# Patient Record
Sex: MALE | Race: WHITE | NOT HISPANIC OR LATINO | URBAN - METROPOLITAN AREA
[De-identification: names, ages, dates, MRNs, and addresses within clinical notes are randomized per-mention and may not be internally consistent; named-entity substitution may affect disease eponyms.]

---

## 2017-08-31 ENCOUNTER — RECORDS - HEALTHEAST (OUTPATIENT)
Dept: LAB | Facility: CLINIC | Age: 57
End: 2017-08-31

## 2017-08-31 LAB
CHOLEST SERPL-MCNC: 190 MG/DL
FASTING STATUS PATIENT QL REPORTED: ABNORMAL
HDLC SERPL-MCNC: 30 MG/DL
LDLC SERPL CALC-MCNC: 124 MG/DL
PSA SERPL-MCNC: 1 NG/ML (ref 0–3.5)
TRIGL SERPL-MCNC: 181 MG/DL

## 2018-03-14 ENCOUNTER — RECORDS - HEALTHEAST (OUTPATIENT)
Dept: LAB | Facility: CLINIC | Age: 58
End: 2018-03-14

## 2018-03-14 LAB
ANION GAP SERPL CALCULATED.3IONS-SCNC: 10 MMOL/L (ref 5–18)
BUN SERPL-MCNC: 12 MG/DL (ref 8–22)
CALCIUM SERPL-MCNC: 9.5 MG/DL (ref 8.5–10.5)
CHLORIDE BLD-SCNC: 103 MMOL/L (ref 98–107)
CHOLEST SERPL-MCNC: 203 MG/DL
CO2 SERPL-SCNC: 27 MMOL/L (ref 22–31)
CREAT SERPL-MCNC: 0.78 MG/DL (ref 0.7–1.3)
FASTING STATUS PATIENT QL REPORTED: ABNORMAL
GFR SERPL CREATININE-BSD FRML MDRD: >60 ML/MIN/1.73M2
GLUCOSE BLD-MCNC: 131 MG/DL (ref 70–125)
HDLC SERPL-MCNC: 33 MG/DL
LDLC SERPL CALC-MCNC: 139 MG/DL
POTASSIUM BLD-SCNC: 4.6 MMOL/L (ref 3.5–5)
SODIUM SERPL-SCNC: 140 MMOL/L (ref 136–145)
TRIGL SERPL-MCNC: 155 MG/DL

## 2018-07-23 ENCOUNTER — RECORDS - HEALTHEAST (OUTPATIENT)
Dept: LAB | Facility: CLINIC | Age: 58
End: 2018-07-23

## 2018-07-23 LAB
ANION GAP SERPL CALCULATED.3IONS-SCNC: 9 MMOL/L (ref 5–18)
BUN SERPL-MCNC: 14 MG/DL (ref 8–22)
CALCIUM SERPL-MCNC: 9.6 MG/DL (ref 8.5–10.5)
CHLORIDE BLD-SCNC: 105 MMOL/L (ref 98–107)
CO2 SERPL-SCNC: 27 MMOL/L (ref 22–31)
CREAT SERPL-MCNC: 0.78 MG/DL (ref 0.7–1.3)
GFR SERPL CREATININE-BSD FRML MDRD: >60 ML/MIN/1.73M2
GLUCOSE BLD-MCNC: 94 MG/DL (ref 70–125)
POTASSIUM BLD-SCNC: 4.7 MMOL/L (ref 3.5–5)
SODIUM SERPL-SCNC: 141 MMOL/L (ref 136–145)

## 2019-03-18 ENCOUNTER — RECORDS - HEALTHEAST (OUTPATIENT)
Dept: LAB | Facility: CLINIC | Age: 59
End: 2019-03-18

## 2019-03-18 LAB
ANION GAP SERPL CALCULATED.3IONS-SCNC: 11 MMOL/L (ref 5–18)
BUN SERPL-MCNC: 14 MG/DL (ref 8–22)
CALCIUM SERPL-MCNC: 8.9 MG/DL (ref 8.5–10.5)
CHLORIDE BLD-SCNC: 104 MMOL/L (ref 98–107)
CHOLEST SERPL-MCNC: 204 MG/DL
CO2 SERPL-SCNC: 25 MMOL/L (ref 22–31)
CREAT SERPL-MCNC: 0.8 MG/DL (ref 0.7–1.3)
FASTING STATUS PATIENT QL REPORTED: ABNORMAL
GFR SERPL CREATININE-BSD FRML MDRD: >60 ML/MIN/1.73M2
GLUCOSE BLD-MCNC: 102 MG/DL (ref 70–125)
HDLC SERPL-MCNC: 45 MG/DL
LDLC SERPL CALC-MCNC: 140 MG/DL
POTASSIUM BLD-SCNC: 4.7 MMOL/L (ref 3.5–5)
SODIUM SERPL-SCNC: 140 MMOL/L (ref 136–145)
TRIGL SERPL-MCNC: 94 MG/DL

## 2019-06-27 ENCOUNTER — RECORDS - HEALTHEAST (OUTPATIENT)
Dept: RADIOLOGY | Facility: CLINIC | Age: 59
End: 2019-06-27

## 2019-06-27 ENCOUNTER — RECORDS - HEALTHEAST (OUTPATIENT)
Dept: ADMINISTRATIVE | Facility: OTHER | Age: 59
End: 2019-06-27

## 2019-07-02 ENCOUNTER — OFFICE VISIT - HEALTHEAST (OUTPATIENT)
Dept: NEUROSURGERY | Facility: CLINIC | Age: 59
End: 2019-07-02

## 2019-07-02 DIAGNOSIS — M54.12 CERVICAL RADICULOPATHY: ICD-10-CM

## 2019-07-02 ASSESSMENT — MIFFLIN-ST. JEOR: SCORE: 2210.18

## 2019-07-19 ENCOUNTER — HOSPITAL ENCOUNTER (OUTPATIENT)
Dept: PHYSICAL MEDICINE AND REHAB | Facility: CLINIC | Age: 59
Discharge: HOME | End: 2019-07-19
Attending: SURGERY

## 2019-07-19 DIAGNOSIS — M54.12 CERVICAL RADICULOPATHY: ICD-10-CM

## 2019-08-27 ENCOUNTER — OFFICE VISIT - HEALTHEAST (OUTPATIENT)
Dept: NEUROSURGERY | Facility: CLINIC | Age: 59
End: 2019-08-27

## 2019-08-27 DIAGNOSIS — M54.12 CERVICAL RADICULOPATHY: ICD-10-CM

## 2019-08-27 RX ORDER — GABAPENTIN 300 MG/1
300 CAPSULE ORAL 3 TIMES DAILY
Qty: 50 CAPSULE | Refills: 2 | Status: SHIPPED | OUTPATIENT
Start: 2019-08-27

## 2019-08-27 ASSESSMENT — MIFFLIN-ST. JEOR: SCORE: 2209.46

## 2019-09-16 ENCOUNTER — OFFICE VISIT - HEALTHEAST (OUTPATIENT)
Dept: PHYSICAL THERAPY | Facility: REHABILITATION | Age: 59
End: 2019-09-16

## 2019-09-16 DIAGNOSIS — M25.512 PAIN IN JOINT OF LEFT SHOULDER: ICD-10-CM

## 2019-09-16 DIAGNOSIS — M62.81 GENERALIZED MUSCLE WEAKNESS: ICD-10-CM

## 2019-09-16 DIAGNOSIS — M25.612 DECREASED RANGE OF MOTION OF LEFT SHOULDER: ICD-10-CM

## 2019-09-25 ENCOUNTER — RECORDS - HEALTHEAST (OUTPATIENT)
Dept: LAB | Facility: CLINIC | Age: 59
End: 2019-09-25

## 2019-09-25 LAB
ANION GAP SERPL CALCULATED.3IONS-SCNC: 7 MMOL/L (ref 5–18)
BUN SERPL-MCNC: 15 MG/DL (ref 8–22)
CALCIUM SERPL-MCNC: 8.8 MG/DL (ref 8.5–10.5)
CHLORIDE BLD-SCNC: 106 MMOL/L (ref 98–107)
CO2 SERPL-SCNC: 26 MMOL/L (ref 22–31)
CREAT SERPL-MCNC: 0.82 MG/DL (ref 0.7–1.3)
GFR SERPL CREATININE-BSD FRML MDRD: >60 ML/MIN/1.73M2
GLUCOSE BLD-MCNC: 139 MG/DL (ref 70–125)
POTASSIUM BLD-SCNC: 4.2 MMOL/L (ref 3.5–5)
SODIUM SERPL-SCNC: 139 MMOL/L (ref 136–145)

## 2019-09-30 ENCOUNTER — OFFICE VISIT - HEALTHEAST (OUTPATIENT)
Dept: PHYSICAL THERAPY | Facility: REHABILITATION | Age: 59
End: 2019-09-30

## 2019-09-30 DIAGNOSIS — M62.81 GENERALIZED MUSCLE WEAKNESS: ICD-10-CM

## 2019-09-30 DIAGNOSIS — M25.512 PAIN IN JOINT OF LEFT SHOULDER: ICD-10-CM

## 2019-09-30 DIAGNOSIS — M25.612 DECREASED RANGE OF MOTION OF LEFT SHOULDER: ICD-10-CM

## 2019-10-07 ENCOUNTER — OFFICE VISIT - HEALTHEAST (OUTPATIENT)
Dept: PHYSICAL THERAPY | Facility: REHABILITATION | Age: 59
End: 2019-10-07

## 2019-10-07 DIAGNOSIS — M62.81 GENERALIZED MUSCLE WEAKNESS: ICD-10-CM

## 2019-10-07 DIAGNOSIS — M25.612 DECREASED RANGE OF MOTION OF LEFT SHOULDER: ICD-10-CM

## 2019-10-07 DIAGNOSIS — M25.512 PAIN IN JOINT OF LEFT SHOULDER: ICD-10-CM

## 2019-10-14 ENCOUNTER — OFFICE VISIT - HEALTHEAST (OUTPATIENT)
Dept: PHYSICAL THERAPY | Facility: REHABILITATION | Age: 59
End: 2019-10-14

## 2019-10-14 DIAGNOSIS — M25.512 PAIN IN JOINT OF LEFT SHOULDER: ICD-10-CM

## 2019-10-14 DIAGNOSIS — M25.612 DECREASED RANGE OF MOTION OF LEFT SHOULDER: ICD-10-CM

## 2019-10-14 DIAGNOSIS — M62.81 GENERALIZED MUSCLE WEAKNESS: ICD-10-CM

## 2019-10-21 ENCOUNTER — OFFICE VISIT - HEALTHEAST (OUTPATIENT)
Dept: PHYSICAL THERAPY | Facility: REHABILITATION | Age: 59
End: 2019-10-21

## 2019-10-21 DIAGNOSIS — M25.512 PAIN IN JOINT OF LEFT SHOULDER: ICD-10-CM

## 2019-10-21 DIAGNOSIS — M62.81 GENERALIZED MUSCLE WEAKNESS: ICD-10-CM

## 2019-10-21 DIAGNOSIS — M25.612 DECREASED RANGE OF MOTION OF LEFT SHOULDER: ICD-10-CM

## 2019-11-11 ENCOUNTER — OFFICE VISIT - HEALTHEAST (OUTPATIENT)
Dept: PHYSICAL THERAPY | Facility: REHABILITATION | Age: 59
End: 2019-11-11

## 2019-11-11 DIAGNOSIS — M25.612 DECREASED RANGE OF MOTION OF LEFT SHOULDER: ICD-10-CM

## 2019-11-11 DIAGNOSIS — M25.512 PAIN IN JOINT OF LEFT SHOULDER: ICD-10-CM

## 2019-11-11 DIAGNOSIS — M62.81 GENERALIZED MUSCLE WEAKNESS: ICD-10-CM

## 2019-12-09 ENCOUNTER — OFFICE VISIT - HEALTHEAST (OUTPATIENT)
Dept: PHYSICAL THERAPY | Facility: REHABILITATION | Age: 59
End: 2019-12-09

## 2019-12-09 DIAGNOSIS — M62.81 GENERALIZED MUSCLE WEAKNESS: ICD-10-CM

## 2019-12-09 DIAGNOSIS — M25.512 PAIN IN JOINT OF LEFT SHOULDER: ICD-10-CM

## 2019-12-09 DIAGNOSIS — M25.612 DECREASED RANGE OF MOTION OF LEFT SHOULDER: ICD-10-CM

## 2020-07-01 ENCOUNTER — RECORDS - HEALTHEAST (OUTPATIENT)
Dept: LAB | Facility: CLINIC | Age: 60
End: 2020-07-01

## 2020-07-01 LAB
ANION GAP SERPL CALCULATED.3IONS-SCNC: 11 MMOL/L (ref 5–18)
BUN SERPL-MCNC: 15 MG/DL (ref 8–22)
CALCIUM SERPL-MCNC: 9.5 MG/DL (ref 8.5–10.5)
CHLORIDE BLD-SCNC: 103 MMOL/L (ref 98–107)
CHOLEST SERPL-MCNC: 210 MG/DL
CO2 SERPL-SCNC: 26 MMOL/L (ref 22–31)
CREAT SERPL-MCNC: 0.83 MG/DL (ref 0.7–1.3)
FASTING STATUS PATIENT QL REPORTED: ABNORMAL
GFR SERPL CREATININE-BSD FRML MDRD: >60 ML/MIN/1.73M2
GLUCOSE BLD-MCNC: 95 MG/DL (ref 70–125)
HDLC SERPL-MCNC: 34 MG/DL
LDLC SERPL CALC-MCNC: 138 MG/DL
POTASSIUM BLD-SCNC: 4.6 MMOL/L (ref 3.5–5)
PSA SERPL-MCNC: 0.6 NG/ML (ref 0–4.5)
SODIUM SERPL-SCNC: 140 MMOL/L (ref 136–145)
TRIGL SERPL-MCNC: 188 MG/DL

## 2021-04-01 ENCOUNTER — RECORDS - HEALTHEAST (OUTPATIENT)
Dept: LAB | Facility: CLINIC | Age: 61
End: 2021-04-01

## 2021-04-01 LAB
ANION GAP SERPL CALCULATED.3IONS-SCNC: 10 MMOL/L (ref 5–18)
BUN SERPL-MCNC: 24 MG/DL (ref 8–22)
CALCIUM SERPL-MCNC: 8.9 MG/DL (ref 8.5–10.5)
CHLORIDE BLD-SCNC: 106 MMOL/L (ref 98–107)
CO2 SERPL-SCNC: 25 MMOL/L (ref 22–31)
CREAT SERPL-MCNC: 0.81 MG/DL (ref 0.7–1.3)
GFR SERPL CREATININE-BSD FRML MDRD: >60 ML/MIN/1.73M2
GLUCOSE BLD-MCNC: 129 MG/DL (ref 70–125)
POTASSIUM BLD-SCNC: 4.4 MMOL/L (ref 3.5–5)
SODIUM SERPL-SCNC: 141 MMOL/L (ref 136–145)

## 2021-05-30 NOTE — PROGRESS NOTES
Neurosurgery consultation was requested by: Brinda Lin MD  Pain: yes in left shoulder and left chest  Radicular Pain is present:   Lhermitte sign: denies  Motor complaints: denies  Sensory complaints: numbness and tingling in left forearm  Gait and balance issues: denies  Bowel or bladder issues: denies  Duration of SX is: 3 months  The symptoms are worse with: physical activity with arms  The symptoms are better with: doing nothing  Injury: denies  Severity is: constant pain and can be severe  Patient has tried the following conservative measures: cortisone shot in left shoulder 5 weeks ago, with minimal relief  NDI score is : 28%  Jhonny Cotter LPN

## 2021-05-30 NOTE — PATIENT INSTRUCTIONS - HE
DISCHARGE INSTRUCTIONS    During office hours (8:00 a.m.- 4:30 p.m.) questions or concerns may be answered  by calling Spine Navigation Nurses at  [phone].     If you experience any problems after hours  please call [phone] and you will be connected to [facility].     All Patients:    ? You may experience an increase in your symptoms for the first 2 days (It may take anywhere between 2 days- 2 weeks for the steroid to have maximum effect).    ? You may use ice on the injection site, as frequently as 20 minutes each hour if needed.    ? You may take your pain medicine.    ? You may continue taking your regular medication after your injection. If you have had a Medial Branch Block you may resume pain medication once your pain diary is completed.    ? You may shower. No swimming, tub bath or hot tub for 48 hours.  You may remove your bandaid/bandage as soon as you are home.    ? You may resume light activities, as tolerated.    ? Resume your usual diet as tolerated.    ? It is strongly advised that you do not drive for 1-3 hours post injection.    ? If you have had oral sedation:  Do not drive for 8 hours post injection.      ? If you have had IV sedation:  Do not drive for 24 hours post injection.  Do not operate hazardous machinery or make important personal/business decisions for 24 hours.      POSSIBLE STEROID SIDE EFFECTS (If steroid/cortisone was used for your procedure)    -If you experience these symptoms, it should only last for a short period      Swelling of the legs                Skin redness (flushing)       Mouth (oral) irritation     Blood sugar (glucose) levels              Sweats                      Mood changes    Headache    Sleeplessness         POSSIBLE PROCEDURE SIDE EFFECTS  -Call the Spine Center if you are concerned    Increased Pain             Increased numbness/tingling        Nausea/Vomiting            Bruising/bleeding at site        Redness or swelling                                                 Difficulty walking        Weakness             Fever greater than 100.5    *In the event of a severe headache after an epidural steroid injection that is relieved by lying down, please call the [facility] to speak with a clinical staff member*

## 2021-05-30 NOTE — PROGRESS NOTES
NEUROSURGERY CONSULTATION NOTE    7/2/2019     Sanya Masterson is a 58 y.o. male who is sent to us in consultation by Brinda Lin for evaluation of cervical stenosis.         CONSULTATION ASSESSMENT AND PLAN:     59 yo male who presents with neck and left arm pain. Left arm pain described as likely C5 but because he has loss of sensation in his left thumb from a prior partial amputation could be C6 as well. MRI shows multi-level stenosis. Some mild flattening of the cord at C6-7. Currently denies myelopathy symptoms and no myelopathy on exam. He has stenosis of the left foramen at C4-5, C5-6, C6-7 as well. Discussed starting with a left C4-5 TFESI. Also has symptoms of b/l carpal tunnel syndrome. He will try a wrist splint if these become more bothersome. Can consider future EMG if TFESI does not provide relief or CTS symptoms worsen. Also recommend weight loss given BMI is 45.17.    I spent more than 45 minutes in this apt, examining the pt, reviewing the scans, reviewing notes from chart, discussing treatment options with risks and benefits and coordinating care. >50 % clinic time was spent in face to face counseling and coordinating care    Vandana Wilder       HPI:  59 yo male who presents with neck and left arm pain. Symptoms started 3 months ago without precipitating events. Mechanical work for a living. Pain travels down left arm into the proximal forearm and does not go into the hand. If he has a lot of activity the pain will worsen. Also will worsen with heavy lifting. Rest will improve his pain.  Hands at night b/l will have numbness and tingling. This doesn't happen during the night. No right arm symptoms. No weakness. No bowel or bladder dysfunction. No falls or imbalance.      NSAIDs and ASA with some relief of his pain. No spinal injection or PT yet.     Prior Spine Surgery: no    Past Medical History:   Diagnosis Date     Elevated HDL      Hypertension      Past Surgical History:   Procedure  Laterality Date     SINUS SURGERY         REVIEW OF SYSTEMs:  ROS reviewed with pt as documented on pt health form of 7/2/2019.    Negative cardiac, pulmonary, hematological with exception of neurological as per HPI  .  No family hx of anesthetic reactions  .  No family hx of hypercoagulability .       MEDICATIONS:  Current Outpatient Medications   Medication Sig Dispense Refill     aspirin 81 MG EC tablet Take 81 mg by mouth daily.       atenolol (TENORMIN) 100 MG tablet Take 100 mg by mouth daily.       EPINEPHrine (EPIPEN) 0.3 mg/0.3 mL atIn Inject 0.3 mL (0.3 mg total) into the shoulder, thigh, or buttocks as needed. 1 Pre-filled Pen Syringe 0     gemfibrozil (LOPID) 600 MG tablet Take 600 mg by mouth 2 (two) times a day before meals.       levocetirizine (XYZAL) 5 MG tablet Take 5 mg by mouth daily as needed. 8/20/15 currently undergoing allergy shots so not taking daily       multivitamin therapeutic (THERAGRAN) tablet Take 1 tablet by mouth daily.       mupirocin (BACTROBAN) 2 % ointment Apply 1 application topically 2 (two) times a day as needed. When needed for sore in nose       oxymetazoline (NASAL DECONGESTANT, OXYMETAZL,) 0.05 % nasal spray 1 spray into each nostril 2 (two) times a day as needed for congestion. Zicam product Over the Counter -- Should not use for more than 3 days at a time to prevent rebound congestion when you stop using       rosuvastatin (CRESTOR) 10 MG tablet Take 10 mg by mouth daily.        No current facility-administered medications for this visit.          ALLERGIES/SENSITIVITIES:     Simvastatin, atorvastatin,fenfibrate, niacin, lisinopril, furosemide    PERTINENT SOCIAL HISTORY:   Social History     Socioeconomic History     Marital status: Single     Spouse name: None     Number of children: None     Years of education: None     Highest education level: None   Occupational History     None   Social Needs     Financial resource strain: None     Food insecurity:     Worry:  "None     Inability: None     Transportation needs:     Medical: None     Non-medical: None   Tobacco Use     Smoking status: Never Smoker     Smokeless tobacco: Former User   Substance and Sexual Activity     Alcohol use: Yes     Drug use: Never     Sexual activity: None   Lifestyle     Physical activity:     Days per week: None     Minutes per session: None     Stress: None   Relationships     Social connections:     Talks on phone: None     Gets together: None     Attends [employer] service: None     Active member of club or organization: None     Attends meetings of clubs or organizations: None     Relationship status: None     Intimate partner violence:     Fear of current or ex partner: None     Emotionally abused: None     Physically abused: None     Forced sexual activity: None   Other Topics Concern     None   Social History Narrative     None         FAMILY HISTORY:  Family History   Problem Relation Age of Onset     Cancer Other      Diabetes Other      Heart attack Other      Hypertension Other         PHYSICAL EXAM:   Constitutional: BP (!) 175/102   Pulse 77   Ht 5' 9.49\" (1.765 m)   Wt (!) 310 lb 3.2 oz (140.7 kg)   BMI 45.17 kg/m       Mental Status: A & O in no acute distress.  Affect is appropriate.  Speech is fluent.  Recent and remote memory are intact.  Attention span and concentration are normal.     Cranial Nerves: CN1: grossly intact per patient recall. CN2: No funduscopic exam performed. CN3,4 & 6: Pupillary light response, lateral and vertical gaze normal.  No nystagmus.  Visual fields are full to confrontation. CN5: Intact to touch CN7: No facial weakness, smile, facial symmetry intact. CN8: Intact to spoken voice. CN9&10: Gag reflex, uvula midline, palate rises with phonation. CN11: Shoulder shrug 5/5 intact bilaterally. CN12: Tongue midline and moves freely from side to side.     Motor: No pronator drift of upper extremity. Normal bulk and tone all muscle groups of upper and lower " extremities.     Sensory: Sensation intact bilaterally to light touch except diminished in left thumb      Coordination:  Heel/toe/  gait intact.    intact  tandem gait      Reflexes; supinator, biceps, triceps, knee/ ankle jerk intact- hypo throughout. no hoffmans/ no    babinski/ clonus.  Neg tinel or phalen b/l    IMAGING: I personally reviewed all radiographic images      Cc:   Brinda Lin MD  [address] [place] [address]  [facility] [address]

## 2021-05-31 ENCOUNTER — RECORDS - HEALTHEAST (OUTPATIENT)
Dept: ADMINISTRATIVE | Facility: CLINIC | Age: 61
End: 2021-05-31

## 2021-05-31 NOTE — PROGRESS NOTES
Sanya Masterson presents today for a follow up after an injection at level C4C5 on 7/2/2019. He states the injection did not touch the pain at all. He did not feel any relief.   Jhonny Cotter LPN

## 2021-05-31 NOTE — PATIENT INSTRUCTIONS - HE
Start gabapentin (titration sheet given at appointment)  F/U in 3 months   Start Physical Therapy      The gabapentin titration instructions are as follow:    Day 1: 1 tablet at bedtime  Day 2: 1 tablet at bedtime  Day 3: 1 tablet at bedtime  Day 4: 1 tablet in the morning and 1 tablet at bedtime  Day 5: 1 tablet in the morning and 1 tablet at bedtime  Day 6: 1 tablet in the morning and 1 tablet at bedtime  Day 7: I tablet in the morning, 1 tablet in the afternoon, 1 tablet at bedtime  Day 8: I tablet in the morning, 1 tablet in the afternoon, 1 tablet at bedtime  Day 9: I tablet in the morning, 1 tablet in the afternoon, 1 tablet at bedtime  Day 10: 1 tablet in the morning, 1 tablet in the afternoon, 2 tablets at bedtime  Day 11: 1 tablet in the morning, 1 tablet in the afternoon, 2 tablets at bedtime  Day 12: 1 tablet in the morning, 1 tablet in the afternoon, 2 tablets at bedtime  Day 13: 2 tablets in the morning, 1 tablet in the afternoon, 2 tablets at bedtime  Day 14: 2 tablets in the morning, 1 tablet in the afternoon, 2 tablets at bedtime  Day 15: 2 tablets in the morning, 1 tablet in the afternoon, 2 tablets at bedtime  Day 16: 2 tablets in the morning, 2 tablets in the afternoon, 2 tablets at bedtime  Day 17: 2 tablets in the morning, 2 tablets in the afternoon, 2 tablets at bedtime  Day 18:  2 tablets in the morning, 2 tablets in the afternoon, 2 tablets at bedtime  Day 19:  2 tablets in the morning, 2 tablets in the afternoon, 3 tablets at bedtime  Day 20: 2 tablets in the morning, 2 tablets in the afternoon, 3 tablets at bedtime  Day 21: 2 tablets in the morning, 2 tablets in the afternoon, 3 tablets at bedtime  Day 22: 3 tablets in the morning, 2 tablets in the afternoon, 3 tablets at bedtime  Day 23: 3 tablets in the morning, 2 tablets in the afternoon, 3 tablets at bedtime  Day 24: 3 tablets in the morning, 2 tablets in the afternoon, 3 tablets at bedtime  Day 25: 3 tablets in the morning, 3 tablets  in the afternoon, 3 tablets at bedtime  Day 26: 3 tablets in the morning, 3 tablets in the afternoon, 3 tablets at bedtime  Day 27: 3 tablets in the morning, 3 tablets in the afternoon, 3 tablets at bedtime

## 2021-05-31 NOTE — PROGRESS NOTES
"NEUROSURGERY FOLLOWUP  NOTE    Sanya Masterson comes today in f/u after prior apt on 7/2/19 for cervical stenosis. 57 yo male who presents with neck and left arm pain. Left arm pain described as likely C5 but because he has loss of sensation in his left thumb from a prior partial amputation could be C6 as well. MRI shows multi-level stenosis. Some mild flattening of the cord at C6-7. Currently denies myelopathy symptoms and no myelopathy on exam. He has stenosis of the left foramen at C4-5, C5-6, C6-7 as well. Discussed starting with a left C4-5 TFESI. Also has symptoms of b/l carpal tunnel syndrome. He will try a wrist splint if these become more bothersome. Can consider future EMG if TFESI does not provide relief or CTS symptoms worsen. Also recommend weight loss given BMI is 45.17.    He did not have relief with his C4-5 injection per patient. He continues to have left shoulder pain into proximal forearm. CTS symptoms at night but not bothersome. Would like to avoid surgery and is interested in other conservative management. Denies imbalance or difficulties with fine motor tasks.     The pts PMH, PSH, ROS, Meds, Allergies, SH, FH are all unchanged and summarized in the pts health history from last visit        PHYSICAL EXAM:   Constitutional: /90   Pulse 78   Resp 16   Ht 5' 9.5\" (1.765 m)   Wt (!) 310 lb (140.6 kg)   SpO2 94%   BMI 45.12 kg/m       Mental Status: A & O in no acute distress.  Affect is appropriate.  Speech is fluent.  Recent and remote memory are intact.  Attention span and concentration are normal.     Cranial Nerves: CN1: grossly intact per patient recall. CN2: No funduscopic exam performed. CN3,4 & 6: Pupillary light response, lateral and vertical gaze normal.  No nystagmus.  Visual fields are full to confrontation. CN5: Intact to touch CN7: No facial weakness, smile, facial symmetry intact. CN8: Intact to spoken voice. CN9&10: Gag reflex, uvula midline, palate rises with phonation. " CN11: Shoulder shrug 5/5 intact bilaterally. CN12: Tongue midline and moves freely from side to side.     Motor: No pronator drift of upper extremity. Normal bulk and tone all muscle groups of upper and lower extremities.     Sensory: Sensation intact bilaterally to light touch.      Coordination:   Gait intact     IMAGING:   I personally reviewed all radiographic images     CONSULTATION ASSESSMENT AND PLAN:    59 yo male who presents with neck and left arm pain. Left arm pain described as likely C5 but because he has loss of sensation in his left thumb from a prior partial amputation could be C6 as well. MRI shows multi-level stenosis. Some mild flattening of the cord at C6-7. Currently denies myelopathy symptoms and no myelopathy on exam. He has stenosis of the left foramen at C4-5, C5-6, C6-7 as well. No relief from TFESI. Discussed a trial of PT and gabapentin. F/u in 3 months.     I spent more than 15 minutes in this apt, examining the pt, reviewing the scans, reviewing notes from chart, discussing treatment options with risks and benefits and coordinating care. >50 % clinic time was spent in face to face counseling and coordinating care    Vandana Wilder      CC:     Brinda Lin MD  [address] [place] [address]  [facility] [id number]

## 2021-06-01 NOTE — PROGRESS NOTES
[facility]   Initial Evaluation    Patient Name: Sanya Masterson  Date of evaluation: 9/18/2019  Visit number: 1/12  Referring Provider: Vandana Wilder MD  Referring Diagnosis: Cervical radiculopathy  Visit Diagnosis:     ICD-10-CM    1. Pain in joint of left shoulder M25.512    2. Generalized muscle weakness M62.81    3. Decreased range of motion of left shoulder M25.612        Assessment:      Sanya Masterson is a 58 y.o. male who presents to therapy today with chief complaints of L shoulder pain. 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  Pt reported he initially thought he may be having a heart attack so he went in and had full cardiac work up and states that this was negative and heart is performing well.  Pain symptoms are somewhat improved at this time as pt can now work on cars for about 3 hours but if he does more than that his pain will significantly increase and take awhile to decrease.  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  Pt demo's signs and sx consistent with  L shoulder capsular stiffness, decreased ROM and mild rotator cuff dysfunction.     Pt. is appropriate for skilled PT intervention as outlined in the Plan of Care (POC).  Pt. is a good candidate for skilled PT services to improve pain levels and function.    Goals:  Pt. will demonstrate/verbalize independence in self-management of condition in : 12 weeks  Pt. will have improved quality of sleep: with less pain;waking less times/night;in 12 weeks;Comment  Comment:: be able to lay on L side with minimal pain and difficulty  Patient will reach / maintain arm movement: behind;for dressing;overhead;for other activity;Comment;in 12 weeks;with less pain;with less difficulty  for other activity: for putting on  belt  Comment: for working on cars    Pt will: be able to perform up to 4 hours working on cars with minimal pain and difficulty in 12 weeks.      Patient's expectations/goals are realistic.    Barriers to Learning or Achieving Goals:  No Barriers.       Plan / Patient Instructions:      Plan for next visit:  Assess response to HEP for s/l ER, and ER/IR stretches. Reassess L shoulder ROM and attempt jt mobs as able.    Plan of Care:   Communication with: Referral Source  Patient Related Instruction: Nature of Condition;Treatment plan and rationale;Self Care instruction;Basis of treatment;Body mechanics;Posture;Precautions;Next steps;Expected outcome  Times per Week: 1  Number of Weeks: 12  Number of Visits: 12  Discharge Planning: when indicated  Precautions / Restrictions : none  Therapeutic Exercise: ROM;Stretching;Strengthening  Neuromuscular Reeducation: posture;TNE;core;other  Neuromuscular Re-education: neurodynamics  Manual Therapy: soft tissue mobilization;joint mobilization;muscle energy  Functional Training (ADL's): self care;ADL's    Treatment techniques, plan of care, and goals were discussed with the patient.  The patient agrees to the plan as outlined.  The plan of care is dynamic and will be modified on an ongoing basis.       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   Last March he was working on a car like usual and he started having this L shoulder pain. Pt initially thought he was having a heart attack with chest, shoulder blade and arm pain but heart was fully checked out and that is fine.  Pt then did work up with spine center. MRI of cervical spine showed some stenosis. Pt reports he had a L shoulder injection too with minimal change to sx.    Pt reports he can usually last up to 3 hours now with working on cars without increased pain but if he does  more over that in a row than pain can get more and then be hard to calm down.    Pt reports xray of L shoulder was fine.    Pt reports starting gabapentin for 2 weeks and this hasn't changed pain much.  Pt also reports injection at C4 and injection in L shoulder did not change sx much either.    Pt has decreased sensation in L thumb in  and this hasn't changed since L shoulder pain has started.    Pt reports pain with sleeping on L side, pain with reaching behind back to put belt on and working on cars.    Pain Ratin}  Pain rating at best: 2  Pain rating at worst: 9  Pain description: aching, dull and sharp    Patient reports benefit from:  anti-inflammatory       Objective:      Patient Outcome Measures :    Neck Disability Score in %: 14     Scores range from 0-100%, where a score of 0% represents minimal pain and maximal function. The minmal clinically important difference is a score reduction of 10%.    Precautions/Restrictions: None  Involved side: Left  Posture Observation:      General sitting posture is  fair.  Gait: WNL  Palpation: Tender anterior capsule and biceps tendon L shoulder  ROM: Neck flexion 35 degrees without pain, ext 50 degrees without pain, B rotation WNL with mild pain on L side with R rotation, B SB WNL with mild pain on L side with R SB - no shoulder pain reproduced    R shoulder ROM WNL with flex 165, abd 180, ER 80 and IR T9    L shoulder ROM flex 150, abd 180 with mild pain, ER 66 with pain, IR L5 with significant pain.    Strength: B shoulder and UE grossly 5/5 except L ER 5-/5 with mild pain, 5/5 tricep but mild pain    Sensation: Intact to light touch except L thumb (was pre-existing)    Special tests:  L shoulder Positive ERST, negative painful arc    Treatment Today      TREATMENT MINUTES COMMENTS   Evaluation 30 L shoulder/neck   Self-care/ Home management     Manual therapy     Neuromuscular Re-education     Therapeutic Activity     Therapeutic Exercises 24 Discussed eval  findings and POC. HEP instruction per Patient Instructions with written handout given.    Gait training     Modality__________________                Total 54    Blank areas are intentional and mean the treatment did not include these items.        PT Evaluation Code: (Please list factors)  Patient History/Comorbidities: mild positive findings cervical MRI  Examination: neck/shoulder  Clinical Presentation: stable  Clinical Decision Making: low    Patient History/  Comorbidities Examination  (body structures and functions, activity limitations, and/or participation restrictions) Clinical Presentation Clinical Decision Making (Complexity)   No documented Comorbidities or personal factors 1-2 Elements Stable and/or uncomplicated Low   1-2 documented comorbidities or personal factor 3 Elements Evolving clinical presentation with changing characteristics Moderate   3-4 documented comorbidities or personal factors 4 or more Unstable and unpredictable High       Saloni Stoddard PT, DPT, OCS, CLT  9/18/2019  8:07 AM

## 2021-06-01 NOTE — PROGRESS NOTES
[facility] Daily Progress     Patient Name: Sanya Masterson  Date: 2019  Visit #:   Referring Provider: Brinda Lin MD  Referring Diagnosis: Cervical radiculopathy  Visit Diagnosis:     ICD-10-CM    1. Pain in joint of left shoulder M25.512    2. Generalized muscle weakness M62.81    3. Decreased range of motion of left shoulder M25.612        Assessment:     Pt demo's improved L shoulder ROM with decreased pain sensitivity over past 1.5 weeks.    Patient is benefitting from skilled physical therapy and is making steady progress toward functional goals.  Patient is appropriate to continue with skilled physical therapy intervention, as indicated by initial plan of care.    Goal Status: ongoing  Pt. will demonstrate/verbalize independence in self-management of condition in : 12 weeks  Pt. 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      Plan / Patient Education:     Continue with initial plan of care.    Assess response to MT after session.  Continue with rotator cuff stretching and MT.  Reassess ER/IR ROM.    Subjective:     Pain Ratin  Pt reports he has been feeling better over the past couple of weeks. He had been vacationing but was still using arm to cut and split wood but did fine with shoulder while gone.  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    Patient Outcome Measures:  Neck Disability Score in %: 14     Scores range from  "0-100%, where a score of 0% represents minimal pain and maximal function. The minmal clinically important difference is a score reduction of 10%. FROM INITIAL    Objective:     L shoulder flex 155 degrees (was 150), abd 180 with minimal pain (was mild), L ER 72 degrees with min pain (was 66 and mild pain), IR L1 with pain (was L5)    Significant tenderness L subscapularis    Treatment Today      TREATMENT MINUTES COMMENTS   Evaluation     Self-care/ Home management     Manual therapy 14 Performed L shoulder jt supine GH jt mobs grade II/III x30\" oscillations distraction and lateral in open pack and mid range elevation. STM L subscapularis.   Neuromuscular Re-education     Therapeutic Activity     Therapeutic Exercises 14 Reassessed L shoulder ROM and discussed progress. Attempted supine and standing L shoulder IR strengthening without any fatigue x20 reps x2-3 attempts. Performed PROM L shoulder ER and IR in open pack position x10 reps. Reviewed and modified HEP to focus on stretching per pt instructions and printed for home.   Gait training     Modality__________________                Total 28    Blank areas are intentional and mean the treatment did not include these items.       Saloni Stoddard PT, DPT, OCS, CLT  9/30/2019  1:34 PM        "

## 2021-06-02 NOTE — PROGRESS NOTES
[facility] Daily Progress     Patient Name: Sanya Masterson  Date: 10/14/2019  Visit #:   Referring Provider: Brinda Lin MD  Referring Diagnosis: Cervical radiculopathy  Visit Diagnosis:     ICD-10-CM    1. Pain in joint of left shoulder M25.512    2. Generalized muscle weakness M62.81    3. Decreased range of motion of left shoulder M25.612        Assessment:     Pt demo's continued improved L shoulder ROM with improved ability to work and sleeping on L side.    Patient is benefitting from skilled physical therapy and is making steady progress toward functional goals.  Patient is appropriate to continue with skilled physical therapy intervention, as indicated by initial plan of care.    Goal Status: ongoing  Pt. will demonstrate/verbalize independence in self-management of condition in : 12 weeks    Pt. 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 - IMPROVING - did painting with minimal L side sensitivity - both sides got to a point of fatigue but not noticeably just the L side      Plan / Patient Education:     Continue with initial plan of care.    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  Continue with rotator cuff stretching and MT.  Reassess ER/IR ROM.  If doing well with IR ROM pt to attempt I with HEP x2-3 weeks.    Subjective:     Pain Ratin   Pt reports he did another week of painting cars and doing some yard work  "and doing pretty well.    Pt reports he wasn't able to lay on L side at all initially and now he can lay on it for a couple of hours.    Today he will be having to hold a chain saw overhead for cleaning up brush - will see how it goes.     Patient Outcome Measures:  Neck Disability Score in %: 14     Scores range from 0-100%, where a score of 0% represents minimal pain and maximal function. The minmal clinically important difference is a score reduction of 10%. FROM INITIAL    Objective:     L shoulder flex 165 degrees (was 150), abd 180 with minimal pain (was mild), B ER at 75 degree today with stretch only (was 60-72 in past) , IR T10 with pain (was L5, R at T8)    Mod tone with tenderness L upper trap, lev scap, supraspinatus and subscap    FROM LAST VISITS  Lift off test mild pain 5/5, belly test 5/5 without pain, mild pain ERST 5/5, no pain IRST 5/5      Treatment Today      TREATMENT MINUTES COMMENTS   Evaluation     Self-care/ Home management     Manual therapy 14 Performed STM L subscapularis, upper trap, lev scap and supra in R S/L with scap MWM into retraction/upward rotation. Performed L shoulder jt supine GH jt mobs grade II/III x30\" oscillations distraction and A/P in open pack and mid range IR.   Neuromuscular Re-education     Therapeutic Activity     Therapeutic Exercises 16 Reassessed L shoulder ROM and discussed progress.  Performed AAROM L shoulder scap plane abd x10 reps x2 sets in R s/l.  Reviewed, performed and added to HEP per pt instructions and printed for home.   Gait training     Modality__________________                Total 30    Blank areas are intentional and mean the treatment did not include these items.       Saloni Stoddard PT, DPT, OCS, CLT  10/14/2019  1:34 PM    "

## 2021-06-02 NOTE — PROGRESS NOTES
[facility] Daily Progress     Patient Name: Sanya Masterson  Date: 10/7/2019  Visit #: 3/12  Referring Provider: Brinda Lin MD  Referring Diagnosis: Cervical radiculopathy  Visit Diagnosis:     ICD-10-CM    1. Pain in joint of left shoulder M25.512    2. Generalized muscle weakness M62.81    3. Decreased range of motion of left shoulder M25.612        Assessment:     Pt demo's continued improved L shoulder ROM with improved ability to work for longer hours with decreased sx.    Patient is benefitting from skilled physical therapy and is making steady progress toward functional goals.  Patient is appropriate to continue with skilled physical therapy intervention, as indicated by initial plan of care.    Goal Status: ongoing  Pt. will demonstrate/verbalize independence in self-management of condition in : 12 weeks    Pt. 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 - IMPROVING      Plan / Patient Education:     Continue with initial plan of care.    Assess response to MT after session.  Continue with rotator cuff stretching and MT.  Reassess ER/IR ROM.    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    Pt reports when he does his IR stretch he can get increased pain and can have it afterwards.    Pain rating not bad at all today - pt can feel something but very low.    Pt reports he also has been  "able to sleep on L side more with less sensitivity.    Patient Outcome Measures:  Neck Disability Score in %: 14     Scores range from 0-100%, where a score of 0% represents minimal pain and maximal function. The minmal clinically important difference is a score reduction of 10%. FROM INITIAL    Objective:     L shoulder flex 164 degrees (was 150), abd 180 with minimal pain (was mild), B ER at 60 degree today with mild soreness but equal (was 66-72 in past) , IR T11 with pain (was L5)    Lift off test mild pain 5/5, belly test 5/5 without pain, mild pain ERST 5/5, no pain IRST 5/5    Moderate tenderness L subscapularis    Treatment Today      TREATMENT MINUTES COMMENTS   Evaluation     Self-care/ Home management     Manual therapy 14 Performed STM L subscapularis, infra and supra in R S/L with scap MWM into retraction/upward rotation. Performed L shoulder jt supine GH jt mobs grade II/III x30\" oscillations distraction and A/P in open pack and mid range IR.    Neuromuscular Re-education     Therapeutic Activity     Therapeutic Exercises 16 Reassessed L shoulder ROM and strength and discussed progress. Contract/relax with IR/ER x5\" x5 reps each mid and end range position. Performed PROM L shoulder ER and IR in open pack position x10 reps.  Reviewed, performed and modified HEP per pt instructions and printed for home.   Gait training     Modality__________________                Total 30    Blank areas are intentional and mean the treatment did not include these items.       Saloni Stoddard PT, DPT, OCS, CLT  10/7/2019  1:34 PM      "

## 2021-06-02 NOTE — PROGRESS NOTES
[facility] Daily Progress     Patient Name: Sanya Masterson  Date: 10/21/2019  Visit #:   Referring Provider: Brinda Lin MD  Referring Diagnosis: Cervical radiculopathy  Visit Diagnosis:     ICD-10-CM    1. Pain in joint of left shoulder M25.512    2. Generalized muscle weakness M62.81    3. Decreased range of motion of left shoulder M25.612        Assessment:     Pt demo's mild regression with IR ROM and pain sensitivity with overhead loaded work last week - with maintenance of flex, abd and ER.     Patient is benefitting from skilled physical therapy and is making steady progress toward functional goals.  Patient is appropriate to continue with skilled physical therapy intervention, as indicated by initial plan of care.    Goal Status: ongoing  Pt. will demonstrate/verbalize independence in self-management of condition in : 12 weeks    Pt. 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  Pt to attempt I with HEP x3 weeks and return for IR reassessment.    Subjective:     Pain Ratin     Pt reports he had to do a lot of overhead with reaching with a chain saw and he could feel that pain into his L shoulder and arm.   Pt reports it has started to calm down again and this week he will be doing more low range  "work with his arms.    Pt also had a thumb injury this weekend and torn his thumb nail off. Pt has this wrapped up today. Thumb hurts more than shoulder.    Patient Outcome Measures:  Neck Disability Score in %: 14     Scores range from 0-100%, where a score of 0% represents minimal pain and maximal function. The minmal clinically important difference is a score reduction of 10%. FROM INITIAL    Objective:     L shoulder flex 165 degrees (was 150), abd 180 with minimal pain (was mild), B ER at 75 degree today with stretch only (was 60-72 in past) , IR T12 with pain (was L5, R at T8)    Mod tone with tenderness L infra and subscap    FROM LAST VISITS  Lift off test mild pain 5/5, belly test 5/5 without pain, mild pain ERST 5/5, no pain IRST 5/5      Treatment Today      TREATMENT MINUTES COMMENTS   Evaluation     Self-care/ Home management     Manual therapy 16 Performed STM L subscapularis and infra in R S/L with scap MWM into retraction/upward rotation. Performed L shoulder jt supine GH jt mobs grade II/III x30\" oscillations distraction and A/P in open pack and mid range IR.   Neuromuscular Re-education     Therapeutic Activity     Therapeutic Exercises 12 Discussed activity levels and HEP since last visit. Reassessed L shoulder ROM and discussed progress.  Performed PROM L shoulder scap plane abd and IR with overpressure x10 reps x2 sets in R s/l Reviewed HEP.   Gait training     Modality__________________                Total 28    Blank areas are intentional and mean the treatment did not include these items.       Saloni Stoddard PT, DPT, OCS, CLT  10/21/2019  11:29 AM  "

## 2021-06-03 VITALS — HEIGHT: 70 IN | WEIGHT: 310 LBS | BODY MASS INDEX: 44.38 KG/M2

## 2021-06-03 VITALS — HEIGHT: 69 IN | BODY MASS INDEX: 45.94 KG/M2 | WEIGHT: 310.2 LBS

## 2021-06-03 NOTE — PROGRESS NOTES
[facility] Daily Progress     Patient Name: Sanya Masterson  Date: 2019  Visit #:   Referring Provider: Brinda Lin MD  Referring Diagnosis: Cervical radiculopathy  Visit Diagnosis:     ICD-10-CM    1. Pain in joint of left shoulder M25.512    2. Generalized muscle weakness M62.81    3. Decreased range of motion of left shoulder M25.612        Assessment:     Pt demo's mild improvement to L shoulder IR since last visit and end of session today.    Patient is benefitting from skilled physical therapy and is making steady progress toward functional goals.  Patient is appropriate to continue with skilled physical therapy intervention, as indicated by initial plan of care.    Goal Status: ongoing  Pt. will demonstrate/verbalize independence in self-management of condition in : 12 weeks    Pt. 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  Pt to attempt I with HEP x4 weeks and return for IR reassessment.    Subjective:     Pain Ratin/10 - hurt more with laying down this weekend    Pt reports he hasn't been moving his L arm as much because of his L thumb injury. Pt reports his shoulder blade has been a little sore starting this weekend - maybe from not doing as  much movement.  Pt reports the radiating pain from the shoulder from last winter is mostly gone but shoulder blade sore and that is where his pain started last winter.     Patient Outcome Measures:  Neck Disability Score in %: 14     Scores range from 0-100%, where a score of 0% represents minimal pain and maximal function. The minmal clinically important difference is a score reduction of 10%. FROM INITIAL    Objective:     L shoulder flex 165 degrees (was 150), abd 180 with minimal pain (was mild), B ER at 72 degree today with stretch only (was 60-72 in past) , IR T11 with pain (was L5, R at T8)    Mod tone with tenderness L infra and subscap    L shoulder IR T10 after session    FROM LAST VISITS  Lift off test mild pain 5/5, belly test 5/5 without pain, mild pain ERST 5/5, no pain IRST 5/5      Treatment Today      TREATMENT MINUTES COMMENTS   Evaluation     Self-care/ Home management     Manual therapy 16 Performed STM L subscapularis and infra in R S/L with scap MWM into retraction/upward rotation open pack and mid range IR.   Neuromuscular Re-education     Therapeutic Activity     Therapeutic Exercises 8 Discussed activity levels and HEP since last visit. Reassessed L shoulder ROM and discussed progress.  Performed PROM L shoulder ext, ER and IR with mild overpressure x10 reps x2 sets in R s/l Reviewed HEP.   Gait training     Modality__________________                Total 24    Blank areas are intentional and mean the treatment did not include these items.       Saloni Stoddard PT, DPT, OCS, CLT  11/11/2019  8:06 AM

## 2021-06-04 NOTE — PROGRESS NOTES
[facility] Daily Progress/Discharge note     Patient Name: Sanya Masterson  Date: 2019  Visit #:   Referring Provider: Brinda Lin MD  Referring Diagnosis: Cervical radiculopathy  Visit Diagnosis:     ICD-10-CM    1. Pain in joint of left shoulder M25.512    2. Generalized muscle weakness M62.81    3. Decreased range of motion of left shoulder M25.612        Assessment:     Pt demo's great improvement to L shoulder ROM and is able to return to PLOF with minimal pain sx remaining.    HEP/POC compliance is  good .  Patient demonstrates understanding/independence with home program.  Response to Intervention Great!    Goal Status: ongoing  Pt. will demonstrate/verbalize independence in self-management of condition in : 12 weeks - MET    Pt. 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  Pt given PT contact info in case of any future questions/concerns.     Subjective:     Pt reports doing pretty good through November. 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  Much easier to put belt on and work in garage x10 hours on ATV with minimal soreness afterwards.    Pain Ratin/10     Patient Outcome  Measures:  Neck Disability Score in %: 2     Scores range from 0-100%, where a score of 0% represents minimal pain and maximal function. The minmal clinically important difference is a score reduction of 10%.   Initially was 14%    Objective:     L shoulder flex 180 degrees (was 150), abd 180 with out pain (was mild), B ER at 72 degree today with stretch only (was 60-72 in past) , IR T9 with pain (was L5, R at T8)    FROM LAST VISITS  Lift off test mild pain 5/5, belly test 5/5 without pain, mild pain ERST 5/5, no pain IRST 5/5    Treatment Today      TREATMENT MINUTES COMMENTS   Evaluation     Self-care/ Home management     Manual therapy     Neuromuscular Re-education     Therapeutic Activity     Therapeutic Exercises 24 Reassessed activity levels and HEP since last visit. Reassessed L shoulder ROM and discussed progress.  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 Strength training programming with cues for technique was initiated per pt instructions and AVS printed for home.   Gait training     Modality__________________                Total 24    Blank areas are intentional and mean the treatment did not include these items.       Saloni Stoddard PT, DPT, OCS, CLT  12/9/2019  12:40 PM

## 2021-06-17 NOTE — PATIENT INSTRUCTIONS - HE
Patient Instructions by Saloni Stoddard PT at 10/7/2019 10:30 AM     Author: Saloni Stoddard PT Service: -- Author Type: Physical Therapist    Filed: 10/7/2019 11:21 AM Encounter Date: 10/7/2019 Status: Signed    : Saloni Stoddard PT (Physical Therapist)            STANDING ER DOORWAY STRETCH    Face the doorjam with elbow bent 90  and hand on wall.  Slowly try to turn your body open into the doorway to get a stretch in the shoulder.  Hold x 10-15 seconds to edge of discomfort  Perform 2-3 reps - 2-3x/day         SIDELYING INTERNAL ROTATION STRETCH - SLEEPER STRETCH    Start by lying on your side with the affected arm on the bottom.Your affected arm should be bent at the elbow and forearm pointed upwards towards the ceiling as shown.Next, use your unaffected arm to gently draw your affected forearm towards the table or bed.  Hold x10-20 seconds 2-3 reps 1-3x/day - as much as feels good

## 2021-06-17 NOTE — PATIENT INSTRUCTIONS - HE
Patient Instructions by Saloni Stoddard PT at 9/30/2019  1:30 PM     Author: Saloni Stoddard PT Service: -- Author Type: Physical Therapist    Filed: 9/30/2019  2:07 PM Encounter Date: 9/30/2019 Status: Signed    : Saloni Stoddard PT (Physical Therapist)            STANDING ER DOORWAY STRETCH    Face the doorjam with elbow bent 90  and hand on wall.  Slowly try to turn your body open into the doorway to get a stretch in the shoulder.  Hold x 10-15 seconds to edge of discomfort  Perform 2-3 reps - 2-3x/day         TOWEL STRETCH    Gently pull up your affected arm behind your back with the assist of a towel.  Hold x5-10 seconds 2-3 reps at edge of pain but feeling a stretch  x2-3 reps 2-3x/day

## 2021-06-17 NOTE — PATIENT INSTRUCTIONS - HE
Patient Instructions by Saloni Stoddard PT at 9/16/2019  8:00 AM     Author: Saloni Stoddard PT Service: -- Author Type: Physical Therapist    Filed: 9/16/2019  9:07 AM Encounter Date: 9/16/2019 Status: Signed    : Saloni Stoddard PT (Physical Therapist)             SIDELYING EXTERNAL ROTATION WITH TOWEL    Lie on your side with your elbow bent to 90 degrees. Place a rolled up towel between your arm and the side your body as shown.     Squeeze your shoulder blade back and down toward your buttocks and hold that position.     Next, roll your arm upwards from your stomach area towards the ceiling while maintaining your arm against the towel and with your shoulder blade held down and back the entire time. Lower your arm and repeat.   x10-20 reps 1-2 sets - can use 2 lb weight if need more to get to fatigue.  2x/day        STANDING ER DOORWAY STRETCH    Face the doorjam with elbow bent 90  and hand on wall.  Slowly try to turn your body open into the doorway to get a stretch in the shoulder.  Hold x 10-15 seconds to edge of discomfort  Perform 2-3 reps - 2-3x/day         TOWEL STRETCH    Gently pull up your affected arm behind your back with the assist of a towel.  Hold x5-10 seconds 2-3 reps at edge of pain but feeling a stretch  x2-3 reps 2-3x/day

## 2021-06-17 NOTE — PATIENT INSTRUCTIONS - HE
Patient Instructions by Saloni Stoddard PT at 10/14/2019  8:00 AM     Author: Saloni Stoddard PT Service: -- Author Type: Physical Therapist    Filed: 10/14/2019  8:35 AM Encounter Date: 10/14/2019 Status: Signed    : Saloni Stoddard PT (Physical Therapist)            STANDING ER DOORWAY STRETCH    Face the doorjam with elbow bent 90  and hand on wall.  Slowly try to turn your body open into the doorway to get a stretch in the shoulder.  Hold x 20-30 seconds to edge of discomfort  Perform 2-3 reps - 2-3x/day         SIDELYING INTERNAL ROTATION STRETCH - SLEEPER STRETCH    Start by lying on your side with the affected arm on the bottom.Your affected arm should be bent at the elbow and forearm pointed upwards towards the ceiling as shown.Next, use your unaffected arm to gently draw your affected forearm towards the table or bed.  Hold x20-30 seconds 2-3 reps 1-3x/day - as much as feels good       Laying down or standing - looking for beneficial stretch in tightness around shoulder with palm forward if standing and palm up if laying   x20-30 seconds x2-3 reps   1-3x/day

## 2021-06-17 NOTE — PATIENT INSTRUCTIONS - HE
Patient Instructions by Saloni Stoddard PT at 12/9/2019  8:00 AM     Author: Saloni Stoddard PT Service: -- Author Type: Physical Therapist    Filed: 12/9/2019  8:42 AM Encounter Date: 12/9/2019 Status: Signed    : Saloni Stoddard PT (Physical Therapist)       Strength Training - 2-3x/week    Bicep curls    Tricep extension    Diagonal raises thumbs up    Push up - start at counter and work down to coffee table and then to floor with knees    Rows/ Bent over rows